# Patient Record
Sex: MALE | Race: WHITE
[De-identification: names, ages, dates, MRNs, and addresses within clinical notes are randomized per-mention and may not be internally consistent; named-entity substitution may affect disease eponyms.]

---

## 2017-01-24 ENCOUNTER — HOSPITAL ENCOUNTER (OUTPATIENT)
Age: 52
Discharge: HOME | End: 2017-01-24
Payer: MEDICAID

## 2017-01-24 NOTE — PN
I am seeing this 51-year-old male patient in followup in regards to 

obstructive sleep apnea. The patient is currently on a CPAP pressure 

of 16 cm of water and is using a Simplus full-face mask. Note that the 

patient's original evaluation and CPAP titration was done on 

3/10/2015, and initial pressure was around 14 cm of water. We have 

tried different masks on this patient, including an Eson full-face 

mask, comfort gel mask, and currently he is using a Simplus. The 

decision was made after he underwent a Pap nap study.  



Clinically he is improved. He is doing well. He is compliant with CPAP 

therapy; however, at times he is still snoring. His wife is concerned 

that the treatment has been suboptimal. I noted that the patient has 

gained a total of 19 pounds since his original titration.  



His BP is 145/78, pulse 100, respiration 16, temperature 98.3. BMI is 

50. Weight is 339. Height is 5 feet 9 inches. Neck size is 19 and 3/9 

inches. Saturation 95% on room air.  

GENERAL APPEARANCE: Obese, calm, comfortable. 

HEENT: Mallampati class IV. There is no goiter or neck masses. 

LUNGS: Clear to auscultation. 

HEART: Heart sounds are regular rate and rhythm. Normal S1, S2. 

ABDOMEN: Soft, nontender. No organomegaly. 

EXTREMITIES: No edema. No cyanosis or clubbing. 



IMPRESSION: 

1. Symptomatic obstructive sleep apnea, currently on CPAP pressure of 

16 utilizing a large-sized Simplus full-face mask.  

2. Snoring while on CPAP therapy. 

3. Obesity with an interval 20-pound weight gain. 

4. Diabetes mellitus. 

5. Hypertension. 

6. Chronic hypersomnia, improved. 



PLAN: 

1. Weight loss. 

2. Continue CPAP at a pressure of 16 cm of water. 

3. Provide the patient an AirFit F10 full-face mask on a trial basis. 

4. We will repeat his CPAP titration in April of 2017 to make sure the 

treatment is adequate, and further adjustments will be done 

accordingly.

## 2017-04-05 ENCOUNTER — HOSPITAL ENCOUNTER (OUTPATIENT)
Dept: HOSPITAL 47 - SLEEP | Age: 52
Discharge: HOME | End: 2017-04-05
Payer: MEDICAID

## 2017-04-05 DIAGNOSIS — E66.9: ICD-10-CM

## 2017-04-05 DIAGNOSIS — G47.33: Primary | ICD-10-CM

## 2017-04-05 DIAGNOSIS — E11.9: ICD-10-CM

## 2017-04-05 DIAGNOSIS — G47.10: ICD-10-CM

## 2017-04-05 DIAGNOSIS — I10: ICD-10-CM

## 2017-04-05 PROCEDURE — 95811 POLYSOM 6/>YRS CPAP 4/> PARM: CPT

## 2017-04-12 NOTE — SLS
DATE OF SERVICE: 04/05/2017



A 51-year-old male patient with known history of symptomatic 

obstructive sleep apnea. The patient was being treated with CPAP 

pressure of 16 cm of water utilizing a full face Simplus mask. 

Original titration was done in March of 2015, and his initial pressure 

was 14 cm of water. The patient was having difficulties in tolerating 

the treatment and we have trialed different masks on the patient 

including an (    ) full-face mask, comfort gel mask, and currently is 

using a Simplus. The patient reported that he was still snoring while 

on the CPAP machine. He had an interval 20 pound weight gain. For all 

of these reasons, I recommend that another CPAP titration for this 

patient to make sure the patient is adequately titrated and to make 

any adjustments on a CPAP pressure possible to improve his overall 

compliance.  



PERTINENT PHYSICAL FINDINGS: Height is 5 feet 9 inches. Weight 331 and 

BMI is 49.0.  



TECHNICAL DESCRIPTION:  The sleep evaluation of the patient consisted 

of clinical polysomnography, nocturnal respiratory battery, left and 

right anterior tibialis surface electromyography. 



The standard montage for the clinical polysomnography included the 

EEG, EOG, EMG, and EKG.  Respiratory battery included measurements of 

nasal buccal airflow, thoracic  and/or abdominal effort and 

intercostal surface EMG.  Nocturnal oxyhemoglobin saturations were 

obtained by finger oximetry.  Digital video and audio monitoring were 

done throughout the entire night to check for parasomnias. 



Step-wise titration with positive airway pressure was utilized during 

the study to control the respiratory events.   



STUDY OVERVIEW: Total time in bed was 6 hours and 35 minutes. Total 

sleep time was 6 hours and 18 minutes. Sleep efficiency was 80.5%. 

Latency to sleep onset was 2.2 minutes, latency to first REM sleep 

55.5 minutes and the patient's sleep architecture was characterized by 

9.7% stage I, 35.5% stage II, 14.4% stage III, and 25.3% REM sleep.  



SLEEP CONTINUITY SUMMARY: There are a total of 7 arousals with an 

index of 10.7. 



RESPIRATORY SUMMARY: The patient was initially started at CPAP 

pressure of 8 cm of water and the pressure was gradually increased by 

increments of 1 cm to reach a maximum CPAP pressure of 16 cm of water. 

I carefully reviewed the CPAP titration, taking into account the 

patient's sleep stage and body position. At a pressure of 12 cm of 

water, the patient was in REM sleep and he assumed the supine and 

sidewise body position. I thought that level of pressure was adequate, 

knowing at the patient's age, this level was around 2.7. No 

significant apneas or hypopneas were noted at this level of pressure. 

The titration was successfully continued to include pressures of 13, 

14, 15 and 16 cm of water and all of these pressures were adequate and 

effective in treating this patient's obstructive sleep apnea. No major 

oxygen desaturations were encountered during the titration.  



LIMB MOVEMENT SUMMARY: A total of 11 periodic limb movements with an 

index of 2.1.  



CARDIAC SUMMARY: Minimum heart rate was 76, maximum heart rate was 109. 



IMPRESSION: 

1. Symptomatic obstructive sleep apnea with ongoing difficulties 

utilizing CPAP. The patient has undergone a repeat CPAP titration.  

2. Symptomatic hypersomnia with an [place] score of 15. 

3. Obesity with interval 20 pound weight loss. 

4. Diabetes. 

5. Hypertension. 



PLAN: 

1. Drop the CPAP pressure to 12 cm of water with a C-Flex of 3. 

2. Continue Simplus full-face mask. 

3. See me back in 30 to 90 days to assess clinical response and 

compliance. Anticipate improvement in his compliancy with updated 

pressures. Encouraged further weight loss. Encouraged maintaining good 

sleep hygiene measures. We will make further recommendations and 

adjustments in his treatment based on his overall clinical response. 

The patient will see me back in the office after utilizing the updated 

pressure of 12 cm of water. Will continue to follow.

## 2017-05-02 ENCOUNTER — HOSPITAL ENCOUNTER (OUTPATIENT)
Dept: HOSPITAL 47 - SLEEP | Age: 52
Discharge: HOME | End: 2017-05-02
Payer: MEDICAID

## 2017-05-02 DIAGNOSIS — E66.9: ICD-10-CM

## 2017-05-02 DIAGNOSIS — Z99.89: ICD-10-CM

## 2017-05-02 DIAGNOSIS — G47.33: Primary | ICD-10-CM

## 2017-05-02 NOTE — PN
This patient is 51, coming in for a compliancy followup regarding his 

obstructive sleep apnea. The patient underwent a recent CPAP 

titration. He was titrated to a CPAP pressure of 12 cm of water. This 

was thought to be a low pressure, as the patient was snoring and he 

was not seeing the full benefit. He was subsequently increased to a 

CPAP pressure of 14 cm of water. His compliance data over the past 30 

days showed excellent CPAP use. CPAP use for more than 4 hours is 27 

out of 30, which accounts for 90% of the time. His average CPAP use is 

7 hours and 6 minutes. The patient has been using his CPAP every 

night. No major leaks around the mask and his leak only is 10 L per 

minute. His AHI while on treatment is 2.3. He is benefiting from the 

treatment. He is waking up refreshed and alert during the day. He does 

not fall asleep while driving. No other complaints otherwise. He liked 

the machine and he is willing to use it even when he takes short naps 

during the day. Mckin[place] score is at 11.  



GENERAL APPEARANCE: Calm, comfortable. 

HEENT: Negative for JVD. There is no goiter or neck mass. 

LUNGS: Diminished breath sounds bilaterally, was clear. Heart sounds 

are regular rate and rhythm. Normal S1, S2. No S3. No S4. No murmurs.  

ABDOMEN: Soft, nontender. No organomegaly. 

EXTREMITIES: No edema. No cyanosis or clubbing. 



IMPRESSION:

1. Symptomatic obstructive sleep apnea. The patient undergoing 

successful continuous positive airway pressure therapy at a pressure 

of 14 cm of water and treatment has been successful.  

2. Obesity. 

3. Diabetes. 

4. Hypertension. 



PLAN:

1. Encourage weight loss. 

2. Compliance data was checked and the treatment is appropriate and 

successful. The patient is benefiting from the treatment. The patient 

will see me back in a year's time; earlier if needed. Keep the CPAP 

pressure of 14. Eliminate the ramp.

## 2018-03-23 ENCOUNTER — HOSPITAL ENCOUNTER (OUTPATIENT)
Dept: HOSPITAL 47 - ORWHC2ENDO | Age: 53
Discharge: HOME | End: 2018-03-23
Payer: MEDICAID

## 2018-03-23 VITALS — RESPIRATION RATE: 18 BRPM | HEART RATE: 92 BPM

## 2018-03-23 VITALS — DIASTOLIC BLOOD PRESSURE: 88 MMHG | SYSTOLIC BLOOD PRESSURE: 141 MMHG

## 2018-03-23 VITALS — TEMPERATURE: 98.3 F

## 2018-03-23 VITALS — BODY MASS INDEX: 45.9 KG/M2

## 2018-03-23 DIAGNOSIS — Z99.89: ICD-10-CM

## 2018-03-23 DIAGNOSIS — Z79.899: ICD-10-CM

## 2018-03-23 DIAGNOSIS — K21.9: ICD-10-CM

## 2018-03-23 DIAGNOSIS — Z88.0: ICD-10-CM

## 2018-03-23 DIAGNOSIS — E11.9: ICD-10-CM

## 2018-03-23 DIAGNOSIS — Z79.82: ICD-10-CM

## 2018-03-23 DIAGNOSIS — E66.01: ICD-10-CM

## 2018-03-23 DIAGNOSIS — Z87.891: ICD-10-CM

## 2018-03-23 DIAGNOSIS — Z12.11: Primary | ICD-10-CM

## 2018-03-23 DIAGNOSIS — G47.33: ICD-10-CM

## 2018-03-23 DIAGNOSIS — D12.2: ICD-10-CM

## 2018-03-23 LAB
GLUCOSE BLD-MCNC: 143 MG/DL (ref 75–99)
GLUCOSE BLD-MCNC: 165 MG/DL (ref 75–99)

## 2018-03-23 PROCEDURE — 88305 TISSUE EXAM BY PATHOLOGIST: CPT

## 2018-03-23 PROCEDURE — 45385 COLONOSCOPY W/LESION REMOVAL: CPT

## 2018-03-23 NOTE — P.GSHP
History of Present Illness


H&P Date: 03/23/18


Chief Complaint: Colon cancer screening





Patient here today for colonoscopy.  Last colonoscopy 9 years ago.  No bowel 

related complaints.  No family history of colon cancer.





Past Medical History


Past Medical History: Diabetes Mellitus, Sleep Apnea/CPAP/BIPAP


History of Any Multi-Drug Resistant Organisms: None Reported


Past Surgical History: Appendectomy


Additional Past Surgical History / Comment(s): COLONOSCOPY


Past Anesthesia/Blood Transfusion Reactions: No Reported Reaction


Smoking Status: Former smoker





- Past Family History


  ** Mother


Family Medical History: No Reported History





Medications and Allergies


 Home Medications











 Medication  Instructions  Recorded  Confirmed  Type


 


Aspirin 325 mg PO DAILY 03/20/18 03/23/18 History


 


Canagliflozin [Invokana] 100 mg PO BID 03/20/18 03/20/18 History


 


Citalopram Hydrobromide [CeleXA] 20 mg PO DAILY 03/20/18 03/23/18 History


 


Cyanocobalamin (Vitamin B-12) 1,000 mcg PO DAILY 03/20/18 03/23/18 History





[Vitamin B-12]    


 


Multivitamin [Men's Multi-Vitamin] 1 each PO DAILY 03/20/18 03/23/18 History


 


Sildenafil Citrate [Viagra] 50 mg PO ONCE PRN 03/20/18 03/23/18 History


 


buPROPion HCL [buPROPion HCL SR] 150 mg PO DAILY 03/20/18 03/23/18 History











 Allergies











Allergy/AdvReac Type Severity Reaction Status Date / Time


 


Penicillins Allergy  Anaphylaxis Verified 03/23/18 07:04














Surgical - Exam


 Vital Signs











Temp Pulse Resp BP Pulse Ox


 


 98.3 F   98   18   131/90   98 


 


 03/23/18 07:14  03/23/18 07:14  03/23/18 07:14  03/23/18 07:14  03/23/18 07:14

















Physical exam:


General: Well-developed, well-nourished


HEENT: Normocephalic, sclerae nonicteric


Abdomen: Nontender, nondistended


Extremities: No edema


Neuro: Alert and oriented





Results





- Labs


 Abnormal Lab Results - Last 24 Hours (Table)











  03/23/18 Range/Units





  07:14 


 


POC Glucose (mg/dL)  165 H  (75-99)  mg/dL














Assessment and Plan


(1) Colon cancer screening


Narrative/Plan: 


Will proceed with colonoscopy at this time


Current Visit: Yes   Status: Acute   Code(s): Z12.11 - ENCOUNTER FOR SCREENING 

FOR MALIGNANT NEOPLASM OF COLON   SNOMED Code(s): 434201495

## 2018-03-23 NOTE — P.PCN
Date of Procedure: 03/23/18


Procedure(s) Performed: 


PREOPERATIVE DIAGNOSIS: Colon cancer screening


POSTOPERATIVE DIAGNOSIS: Ascending colon polyp, sigmoid colon polyp, poor prep


PROCEDURE: Colonoscopy with snare polypectomy


ANESTHESIA: MAC


SURGEON: Pelon Baker M.D.


SPECIMENS: Polyps 


ENDOSCOPIC PROCEDURE:  The patient was placed on the endoscopy table in the 

left decubitus position.  The Olympus colonoscope was inserted into the anus 

and passed under direct visualization to the base of the cecum.  The patient's 

prep was suboptimal with a large volume of liquid stool seen scattered 

throughout the colon.  We aspirated approximately 1 L or more of diarrhea.  

There was some particulate matter that also limited her visualization.  With 

difficulty we were finally able to reach the cecum.  The appendiceal orifice 

was visualized.  From that point the scope was slowly withdrawn inspecting all 

surfaces carefully.  There were no neoplastic inflammatory or polypoid lesions 

throughout the cecum.  In the mid ascending colon there was noted be what 

initially was thought to represent a small to moderate size polyp measuring 

about 1 cm however as we started removing it using the snare with cautery 

technique this had a larger appearance.  I would estimate this at approximately 

1.5-2 cm in diameter.  This was able to be removed in a piecemeal fashion using 

the snare with cautery technique.  This was behind a fold and difficult to 

visualize the base with certainty.  The remainder of the ascending transverse 

and descending colon appeared normal.  In the sigmoid a small polyp was 

identified and removed using the snare technique as well.  There was no visible 

diverticulosis.  Digital rectal examination was normal.  The patient was taken 

to the recovery room in stable condition per anesthesia guidelines.


RECOMMENDATIONS: Await biopsy results.  Recommend follow-up colonoscopy in 6-12 

months because of the poor prep and the larger than expected polyp.

## 2018-09-13 ENCOUNTER — HOSPITAL ENCOUNTER (OUTPATIENT)
Dept: HOSPITAL 47 - BARWHC3 | Age: 53
Discharge: HOME | End: 2018-09-13
Attending: SURGERY
Payer: MEDICAID

## 2018-09-13 VITALS — RESPIRATION RATE: 14 BRPM | DIASTOLIC BLOOD PRESSURE: 85 MMHG | HEART RATE: 92 BPM | SYSTOLIC BLOOD PRESSURE: 157 MMHG

## 2018-09-13 VITALS — BODY MASS INDEX: 50.9 KG/M2

## 2018-09-13 DIAGNOSIS — Z90.89: ICD-10-CM

## 2018-09-13 DIAGNOSIS — F32.9: ICD-10-CM

## 2018-09-13 DIAGNOSIS — E66.01: Primary | ICD-10-CM

## 2018-09-13 PROCEDURE — 99211 OFF/OP EST MAY X REQ PHY/QHP: CPT

## 2018-09-13 NOTE — P.HPBAR
Bariatric H&P





- History & Physicial


H&P Date: 09/13/18


History & Physicial: 


Visit/CC: bariatric consult





Patient initial contact: 





Initial weight: 152.498 kg


Initial weight in pounds: 336.20


Height: 5 ft 8.11 in


Initial BMI: 50.9





Last weight: 





Current weight: 152.498 kg


Current weight in pounds: 336.20


Current BMI: 50.9





Ideal body weight (based on NIH guidelines): 70.153 kg





Excess body weight loss: 0.0%








The patient is a 53 year-old M who presents for Bariatric Assessment.  Patient 

is well-known to our service.  Most recently had a colonoscopy performed in 

March where he was found have a polyp in the right colon.  Pathology was 

negative for dysplasia.  He is due for follow-up colonoscopy at this time.  

Today he is being seen as a new bariatric clinic patient.  Patient has dealt 

with his weight for many years.  Recently he went on a low carb diet and has 

lost about 20 pounds over the last 2 months.  Patient suffers from type 2 

diabetes, arthritis, sleep apnea, mild GERD symptoms.  These medical issues are 

related to his morbid obesity and should be improving surgical weight loss.  No 

history of DVT or dysphagia in the past.














Past Medical History


Past Medical History: Diabetes Mellitus, Sleep Apnea/CPAP/BIPAP


History of Any Multi-Drug Resistant Organisms: None Reported


Past Surgical History: Appendectomy


Additional Past Surgical History / Comment(s): COLONOSCOPY x3


Past Anesthesia/Blood Transfusion Reactions: No Reported Reaction


Additional Past Anesthesia/Blood Transfusion Reaction / Comm: No tranfusion to 

date


Past Psychological History: Depression


Additional Psychological History / Comment(s): takes wellbutrin and celexa daily


Smoking Status: Never smoker


Past Alcohol Use History: Rare


Past Drug Use History: None Reported





- Past Family History


  ** Mother


Family Medical History: No Reported History





Surgical - Exam


 Vital Signs











Pulse Resp BP


 


 92   14   157/85 


 


 09/13/18 15:37  09/13/18 15:37  09/13/18 15:37

















Physical exam:


General: Well-developed, well-nourished


HEENT: Normocephalic, sclerae nonicteric


Abdomen: Nontender, nondistended


Extremities: No edema


Neuro: Alert and oriented





Bariatric Assessment & Plan


(1) Morbid obesity


Narrative/Plan: 


Options of sleeve gastrectomy and gastric bypass reviewed in detail.  The risk 

benefit profile of both were reviewed.  Patient remains assisted sleeve 

gastrectomy at this time.  He would like to continue his lifestyle and dietary 

modification to see if he can continue to lose weight on his own.  Patient 

requires a 6 month supervised weight loss.  We'll tentatively plan upper and 

lower endoscopy in the next month or 2.  If the patient fails in his weight 

loss endeavor will tentatively plan sleeve gastrectomy in early 2018.  The 

risks of bleeding, infection, stenosis, stricture, leak, abscess, fistula 

formation, peritonitis, poor weight loss, reflux, vomiting, conversion to an 

open procedure, aborting sleeve gastrectomy, MI, PE, DVT, and death were 

discussed.  The patient understands and wishes to proceed.


Status: Acute   





Bariatric Checklist


Checklist: 


Plan: 





Checklist: 





EGD: 


1. Hiatal hernia: 


2. H. Pylori: 





HgbA1c: 





Vitamin D: 





Smoking: Never smoker





Primary care physician referral: mar beard





Psychiatry clearance: 





Cardiology clearance: 





Sleep study: 





Diet journal: 





VTE risk score: 





VTE risk level: 





Rehab needs at discharge:

## 2018-10-19 ENCOUNTER — HOSPITAL ENCOUNTER (OUTPATIENT)
Dept: HOSPITAL 47 - ORWHC2ENDO | Age: 53
Discharge: HOME | End: 2018-10-19
Attending: SURGERY
Payer: MEDICAID

## 2018-10-19 VITALS — HEART RATE: 71 BPM | DIASTOLIC BLOOD PRESSURE: 85 MMHG | SYSTOLIC BLOOD PRESSURE: 133 MMHG

## 2018-10-19 VITALS — TEMPERATURE: 97.8 F

## 2018-10-19 VITALS — BODY MASS INDEX: 45 KG/M2

## 2018-10-19 VITALS — RESPIRATION RATE: 16 BRPM

## 2018-10-19 DIAGNOSIS — K25.3: ICD-10-CM

## 2018-10-19 DIAGNOSIS — E66.01: ICD-10-CM

## 2018-10-19 DIAGNOSIS — F32.9: ICD-10-CM

## 2018-10-19 DIAGNOSIS — K29.50: ICD-10-CM

## 2018-10-19 DIAGNOSIS — Z88.0: ICD-10-CM

## 2018-10-19 DIAGNOSIS — Z79.890: ICD-10-CM

## 2018-10-19 DIAGNOSIS — G47.33: ICD-10-CM

## 2018-10-19 DIAGNOSIS — Z12.11: Primary | ICD-10-CM

## 2018-10-19 DIAGNOSIS — E11.9: ICD-10-CM

## 2018-10-19 DIAGNOSIS — Z86.010: ICD-10-CM

## 2018-10-19 DIAGNOSIS — Z99.89: ICD-10-CM

## 2018-10-19 DIAGNOSIS — Z79.899: ICD-10-CM

## 2018-10-19 DIAGNOSIS — Z79.82: ICD-10-CM

## 2018-10-19 LAB — GLUCOSE BLD-MCNC: 118 MG/DL (ref 75–99)

## 2018-10-19 PROCEDURE — 43239 EGD BIOPSY SINGLE/MULTIPLE: CPT

## 2018-10-19 PROCEDURE — 45378 DIAGNOSTIC COLONOSCOPY: CPT

## 2018-10-19 PROCEDURE — 88305 TISSUE EXAM BY PATHOLOGIST: CPT

## 2018-10-19 NOTE — P.GSHP
History of Present Illness


H&P Date: 10/19/18


Chief Complaint: GERD, colon polyps





Patient here today for colonoscopy.  Colonoscopy in March of this year revealed 

a moderate sized ascending colon polyp.  Patient also has mild reflux.  Patient 

being workup for bariatric surgery.  No bowel complaints.





Past Medical History


Past Medical History: Diabetes Mellitus, Sleep Apnea/CPAP/BIPAP


History of Any Multi-Drug Resistant Organisms: None Reported


Past Surgical History: Appendectomy


Additional Past Surgical History / Comment(s): COLONOSCOPY x3


Past Anesthesia/Blood Transfusion Reactions: No Reported Reaction


Additional Past Anesthesia/Blood Transfusion Reaction / Comment(s): No 

tranfusion to date


Smoking Status: Never smoker





- Past Family History


  ** Mother


Family Medical History: No Reported History





Medications and Allergies


 Home Medications











 Medication  Instructions  Recorded  Confirmed  Type


 


Aspirin 325 mg PO DAILY 03/20/18 10/19/18 History


 


Canagliflozin [Invokana] 100 mg PO DAILY 03/20/18 10/19/18 History


 


Citalopram Hydrobromide [CeleXA] 20 mg PO DAILY 03/20/18 10/19/18 History


 


Cyanocobalamin (Vitamin B-12) 1,000 mcg PO DAILY 03/20/18 10/19/18 History





[Vitamin B-12]    


 


Multivitamin [Men's Multi-Vitamin] 1 each PO DAILY 03/20/18 10/19/18 History


 


Sildenafil Citrate [Viagra] 50 mg PO ONCE PRN 03/20/18 10/19/18 History


 


buPROPion HCL [buPROPion HCL SR] 150 mg PO DAILY 03/20/18 10/19/18 History


 


Testosterone Cypionate 100 mg IM Q14D 10/17/18 10/19/18 History





[Depo-Testosterone]    











 Allergies











Allergy/AdvReac Type Severity Reaction Status Date / Time


 


Penicillins Allergy Severe Anaphylaxis Verified 10/19/18 07:45














Surgical - Exam


 Vital Signs











Temp Pulse Resp BP Pulse Ox


 


 97.8 F   87   18   109/77   98 


 


 10/19/18 07:57  10/19/18 07:57  10/19/18 07:57  10/19/18 07:57  10/19/18 07:57

















Physical exam:


General: Well-developed, well-nourished


HEENT: Normocephalic, sclerae nonicteric


Abdomen: Nontender, nondistended


Extremities: No edema


Neuro: Alert and oriented





Results





- Labs


 Abnormal Lab Results - Last 24 Hours (Table)











  10/19/18 Range/Units





  08:08 


 


POC Glucose (mg/dL)  118 H  (75-99)  mg/dL














Assessment and Plan


(1) Colon cancer screening


Narrative/Plan: 


Will proceed with upper and lower endoscopy


Current Visit: No   Status: Acute   Code(s): Z12.11 - ENCOUNTER FOR SCREENING 

FOR MALIGNANT NEOPLASM OF COLON   SNOMED Code(s): 138593064

## 2018-10-19 NOTE — P.PCN
Date of Procedure: 10/19/18


Procedure(s) Performed: 


PREOPERATIVE DIAGNOSIS: GERD, presurgical, history of colon polyps, screening


POSTOPERATIVE DIAGNOSIS: Gastritis with antral ulcerations, poor colonic prep


PROCEDURE: 1.  EGD with biopsy 2.  Colonoscopy 


ANESTHESIA: MAC


SURGEON: Pelon Baker M.D.


SPECIMENS: Antral ulcer, antrum


ENDOSCOPIC PROCEDURE: The patient was on the endoscopy table in the left 

decubitus position.  The Olympus gastroscope was inserted into the oropharynx 

and passed under direct visualization to the region of the third portion of the 

duodenum.  From that point the scope was slowly withdrawn inspecting all 

surfaces carefully.  There were no neoplastic inflammatory or polypoid lesions 

throughout the duodenum.  The pylorus was widely patent.  The stomach was 

carefully inspected.  There was noted to be small ulcers in the antrum.  The 

largest measured about 5-6 mm in size.  This was biopsied..  A biopsy of the 

antrum took place to rule out H. pylori.  Retroflexion revealed a normal 

hiatus.  The esophagus was then carefully examined.  There were no neoplastic 

inflammatory or polypoid lesions throughout the visualized esophagus.


     The patient was kept on the endoscopy table in the left decubitus 

position.  The Olympus colonoscope was inserted into the anus and passed under 

direct visualization to the mid transverse colon.  The patient's prep was quite 

poor throughout.  I could not visualize the mucosa in the visualized colon.  We 

aborted at that point.  No definite abnormalities were identified however.  

Digital rectal examination was normal.  The patient was taken to the recovery 

room in stable condition per anesthesia guidelines.


RECOMMENDATIONS: Await biopsy results.  Begin antiacids.  Will require reprep 

for colonoscopy.

## 2021-02-22 ENCOUNTER — HOSPITAL ENCOUNTER (EMERGENCY)
Dept: HOSPITAL 47 - EC | Age: 56
Discharge: HOME | End: 2021-02-22
Payer: MEDICAID

## 2021-02-22 VITALS — HEART RATE: 90 BPM | SYSTOLIC BLOOD PRESSURE: 133 MMHG | DIASTOLIC BLOOD PRESSURE: 87 MMHG | TEMPERATURE: 98.6 F

## 2021-02-22 VITALS — RESPIRATION RATE: 18 BRPM

## 2021-02-22 DIAGNOSIS — Z88.0: ICD-10-CM

## 2021-02-22 DIAGNOSIS — F32.9: ICD-10-CM

## 2021-02-22 DIAGNOSIS — E11.9: ICD-10-CM

## 2021-02-22 DIAGNOSIS — Z79.899: ICD-10-CM
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
## 2021-02-22 DIAGNOSIS — S29.9XXA: Primary | ICD-10-CM

## 2021-02-22 DIAGNOSIS — Z90.49: ICD-10-CM

## 2021-02-22 DIAGNOSIS — Z99.89: ICD-10-CM

## 2021-02-22 DIAGNOSIS — Z79.84: ICD-10-CM

## 2021-02-22 DIAGNOSIS — G47.33: ICD-10-CM
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
## 2021-02-22 PROCEDURE — 71101 X-RAY EXAM UNILAT RIBS/CHEST: CPT

## 2021-02-22 PROCEDURE — 93005 ELECTROCARDIOGRAM TRACING: CPT

## 2021-02-22 PROCEDURE — 96372 THER/PROPH/DIAG INJ SC/IM: CPT

## 2021-02-22 PROCEDURE — 99283 EMERGENCY DEPT VISIT LOW MDM: CPT

## 2021-02-22 NOTE — ED
Fall HPI





- General


Chief Complaint: Fall


Stated Complaint: Fall


Time Seen by Provider: 02/22/21 04:39


Source: patient


Mode of arrival: ambulatory





- History of Present Illness


MD Complaint: fall


-: minutes(s)


Fall From: down stairs (#) (3)


When Fall Occurred: just prior to arrival


Place Fall Occurred: home


Loss of Consciousness: none


Prolonged Down Time?: no


Symptoms Prior to Fall: none


Location: chest


Severity: moderate


Quality: aching


Context: tripped/slipped


Associated Symptoms: denies





- Related Data


                                Home Medications











 Medication  Instructions  Recorded  Confirmed


 


Aspirin 325 mg PO DAILY 03/20/18 10/19/18


 


Canagliflozin [Invokana] 100 mg PO DAILY 03/20/18 10/19/18


 


Citalopram Hydrobromide [CeleXA] 20 mg PO DAILY 03/20/18 10/19/18


 


Cyanocobalamin (Vitamin B-12) 1,000 mcg PO DAILY 03/20/18 10/19/18





[Vitamin B-12]   


 


Multivitamin [Men's Multi-Vitamin] 1 each PO DAILY 03/20/18 10/19/18


 


Sildenafil Citrate [Viagra] 50 mg PO ONCE PRN 03/20/18 10/19/18


 


buPROPion HCL [buPROPion HCL SR] 150 mg PO DAILY 03/20/18 10/19/18


 


Testosterone Cypionate 100 mg IM Q14D 10/17/18 10/19/18





[Depo-Testosterone]   








                                  Previous Rx's











 Medication  Instructions  Recorded


 


Omeprazole [PriLOSEC] 20 mg PO AC-BRKFST #90 cap 10/19/18


 


HYDROcodone/APAP 5-325MG [Norco 1 tab PO Q4HR PRN 3 Days #18 tab 02/22/21





5-325]  


 


Ibuprofen 800 mg PO TID #20 tablet 02/22/21











                                    Allergies











Allergy/AdvReac Type Severity Reaction Status Date / Time


 


Penicillins Allergy Severe Anaphylaxis Verified 02/22/21 04:36














Review of Systems


ROS Statement: 


Those systems with pertinent positive or pertinent negative responses have been 

documented in the HPI.





ROS Other: All systems not noted in ROS Statement are negative.


Constitutional: Denies: fever, chills


Respiratory: Denies: cough, dyspnea


Cardiovascular: Reports: chest pain.  Denies: palpitations, dyspnea on exertion


Gastrointestinal: Denies: abdominal pain, vomiting, diarrhea


Musculoskeletal: Denies: back pain


Skin: Denies: lesions


Neurological: Denies: headache, weakness, numbness, confusion





Past Medical History


Past Medical History: Diabetes Mellitus, Sleep Apnea/CPAP/BIPAP


History of Any Multi-Drug Resistant Organisms: None Reported


Past Surgical History: Appendectomy


Additional Past Surgical History / Comment(s): COLONOSCOPY x3


Past Anesthesia/Blood Transfusion Reactions: No Reported Reaction


Additional Past Anesthesia/Blood Transfusion Reaction / Comment(s): No 

tranfusion to date


Past Psychological History: Depression


Smoking Status: Never smoker


Past Alcohol Use History: None Reported


Past Drug Use History: None Reported





- Past Family History


  ** Mother


Family Medical History: No Reported History





General Exam


General appearance: alert, in no apparent distress


Head exam: Present: atraumatic, normocephalic


Eye exam: Present: normal appearance.  Absent: scleral icterus, conjunctival 

injection


Neck exam: Present: normal inspection, full ROM.  Absent: tenderness


Respiratory exam: Present: normal lung sounds bilaterally, chest wall 

tenderness.  Absent: respiratory distress, wheezes, rales, rhonchi, stridor


Cardiovascular Exam: Present: regular rate, normal rhythm, normal heart sounds. 

Absent: systolic murmur, diastolic murmur, rubs, gallop


GI/Abdominal exam: Present: soft.  Absent: distended, tenderness, guarding, 

rebound, rigid, mass


Extremities exam: Present: normal inspection, normal capillary refill.  Absent: 

pedal edema, calf tenderness


Back exam: Present: normal inspection.  Absent: vertebral tenderness


Neurological exam: Present: alert, oriented X3, normal gait


Skin exam: Present: warm, dry, intact, normal color.  Absent: rash





Course


                                   Vital Signs











  02/22/21





  04:31


 


Temperature 98.2 F


 


Pulse Rate 92


 


Respiratory 18





Rate 


 


Blood Pressure 147/95


 


O2 Sat by Pulse 98





Oximetry 














Medical Decision Making





- EKG Data


EKG shows normal: sinus rhythm, axis (Normal), intervals (Normal), QRS complexes

(Normal), ST-T waves (Normal)


Rate: normal (Rate 79 bpm)


Interpretation: normal EKG





Disposition


Clinical Impression: 


 Fall, Chest wall injury





Disposition: HOME SELF-CARE


Condition: Good


Instructions (If sedation given, give patient instructions):  Chest Wall Pain 

(ED)


Prescriptions: 


Ibuprofen 800 mg PO TID #20 tablet


HYDROcodone/APAP 5-325MG [Norco 5-325] 1 tab PO Q4HR PRN 3 Days #18 tab


 PRN Reason: Pain


Is patient prescribed a controlled substance at d/c from ED?: Yes


Referrals: 


Abimael Roque MD [Primary Care Provider] - 1-2 days

## 2021-02-22 NOTE — XR
EXAM:

  XR Left Ribs, 2 Views

 

CLINICAL HISTORY:

  ITS.REASON XR Reason: fall

 

TECHNIQUE:

  Frontal and oblique views of the left ribs.

 

COMPARISON:

  No relevant prior studies available.

 

FINDINGS:

  Lungs:  Unremarkable as visualized.  No consolidation.

  Pleural space:  Unremarkable.  No pneumothorax.

  Bones/joints:  Mild degenerative changes in the mid to lower thoracic 

spine.  The left-sided ribs appear smooth and intact.  No fracture is 

identified.

 

IMPRESSION:     

  No acute findings in the left ribs.

## 2022-09-12 ENCOUNTER — HOSPITAL ENCOUNTER (INPATIENT)
Dept: HOSPITAL 47 - EC | Age: 57
LOS: 2 days | Discharge: HOME | DRG: 638 | End: 2022-09-14
Attending: FAMILY MEDICINE | Admitting: FAMILY MEDICINE
Payer: MEDICAID

## 2022-09-12 VITALS — BODY MASS INDEX: 45.4 KG/M2

## 2022-09-12 DIAGNOSIS — Z88.0: ICD-10-CM

## 2022-09-12 DIAGNOSIS — R19.7: ICD-10-CM

## 2022-09-12 DIAGNOSIS — I10: ICD-10-CM

## 2022-09-12 DIAGNOSIS — Z79.4: ICD-10-CM

## 2022-09-12 DIAGNOSIS — F32.A: ICD-10-CM

## 2022-09-12 DIAGNOSIS — E11.10: Primary | ICD-10-CM

## 2022-09-12 DIAGNOSIS — E86.0: ICD-10-CM

## 2022-09-12 DIAGNOSIS — Z79.82: ICD-10-CM

## 2022-09-12 DIAGNOSIS — E66.9: ICD-10-CM

## 2022-09-12 DIAGNOSIS — G47.30: ICD-10-CM

## 2022-09-12 DIAGNOSIS — R00.0: ICD-10-CM

## 2022-09-12 DIAGNOSIS — R53.1: ICD-10-CM

## 2022-09-12 LAB
ALBUMIN SERPL-MCNC: 4.4 G/DL (ref 3.5–5)
ALBUMIN SERPL-MCNC: 4.7 G/DL (ref 3.5–5)
ALBUMIN SERPL-MCNC: 5.6 G/DL (ref 3.5–5)
ALP SERPL-CCNC: 60 U/L (ref 38–126)
ALP SERPL-CCNC: 63 U/L (ref 38–126)
ALP SERPL-CCNC: 79 U/L (ref 38–126)
ALT SERPL-CCNC: 23 U/L (ref 4–49)
ALT SERPL-CCNC: 24 U/L (ref 4–49)
ALT SERPL-CCNC: 31 U/L (ref 4–49)
ANION GAP SERPL CALC-SCNC: 12 MMOL/L
ANION GAP SERPL CALC-SCNC: 16 MMOL/L
ANION GAP SERPL CALC-SCNC: 17 MMOL/L
ANION GAP SERPL CALC-SCNC: 19 MMOL/L
ANION GAP SERPL CALC-SCNC: 26 MMOL/L
APTT BLD: 24.9 SEC (ref 22–30)
AST SERPL-CCNC: 20 U/L (ref 17–59)
AST SERPL-CCNC: 20 U/L (ref 17–59)
AST SERPL-CCNC: 27 U/L (ref 17–59)
BASOPHILS # BLD AUTO: 0 K/UL (ref 0–0.2)
BASOPHILS # BLD AUTO: 0.1 K/UL (ref 0–0.2)
BASOPHILS NFR BLD AUTO: 0 %
BASOPHILS NFR BLD AUTO: 0 %
BUN SERPL-SCNC: 13 MG/DL (ref 9–20)
BUN SERPL-SCNC: 14 MG/DL (ref 9–20)
BUN SERPL-SCNC: 16 MG/DL (ref 9–20)
BUN SERPL-SCNC: 17 MG/DL (ref 9–20)
BUN SERPL-SCNC: 19 MG/DL (ref 9–20)
CALCIUM SPEC-MCNC: 10.6 MG/DL (ref 8.4–10.2)
CALCIUM SPEC-MCNC: 8.9 MG/DL (ref 8.4–10.2)
CALCIUM SPEC-MCNC: 9.3 MG/DL (ref 8.4–10.2)
CHLORIDE SERPL-SCNC: 101 MMOL/L (ref 98–107)
CHLORIDE SERPL-SCNC: 104 MMOL/L (ref 98–107)
CHLORIDE SERPL-SCNC: 104 MMOL/L (ref 98–107)
CHLORIDE SERPL-SCNC: 106 MMOL/L (ref 98–107)
CHLORIDE SERPL-SCNC: 106 MMOL/L (ref 98–107)
CO2 SERPL-SCNC: 11 MMOL/L (ref 22–30)
CO2 SERPL-SCNC: 13 MMOL/L (ref 22–30)
CO2 SERPL-SCNC: 14 MMOL/L (ref 22–30)
CO2 SERPL-SCNC: 15 MMOL/L (ref 22–30)
CO2 SERPL-SCNC: 7 MMOL/L (ref 22–30)
EOSINOPHIL # BLD AUTO: 0.1 K/UL (ref 0–0.7)
EOSINOPHIL # BLD AUTO: 0.1 K/UL (ref 0–0.7)
EOSINOPHIL NFR BLD AUTO: 0 %
EOSINOPHIL NFR BLD AUTO: 1 %
ERYTHROCYTE [DISTWIDTH] IN BLOOD BY AUTOMATED COUNT: 5.79 M/UL (ref 4.3–5.9)
ERYTHROCYTE [DISTWIDTH] IN BLOOD BY AUTOMATED COUNT: 6.6 M/UL (ref 4.3–5.9)
ERYTHROCYTE [DISTWIDTH] IN BLOOD: 14.2 % (ref 11.5–15.5)
ERYTHROCYTE [DISTWIDTH] IN BLOOD: 14.3 % (ref 11.5–15.5)
GLUCOSE BLD-MCNC: 119 MG/DL (ref 70–110)
GLUCOSE BLD-MCNC: 136 MG/DL (ref 70–110)
GLUCOSE BLD-MCNC: 145 MG/DL (ref 70–110)
GLUCOSE BLD-MCNC: 150 MG/DL (ref 70–110)
GLUCOSE BLD-MCNC: 158 MG/DL (ref 70–110)
GLUCOSE BLD-MCNC: 167 MG/DL (ref 70–110)
GLUCOSE BLD-MCNC: 167 MG/DL (ref 70–110)
GLUCOSE BLD-MCNC: 176 MG/DL (ref 70–110)
GLUCOSE BLD-MCNC: 178 MG/DL (ref 70–110)
GLUCOSE BLD-MCNC: 178 MG/DL (ref 70–110)
GLUCOSE BLD-MCNC: 182 MG/DL (ref 70–110)
GLUCOSE BLD-MCNC: 183 MG/DL (ref 70–110)
GLUCOSE BLD-MCNC: 187 MG/DL (ref 70–110)
GLUCOSE BLD-MCNC: 200 MG/DL (ref 70–110)
GLUCOSE BLD-MCNC: 206 MG/DL (ref 70–110)
GLUCOSE BLD-MCNC: 295 MG/DL (ref 70–110)
GLUCOSE SERPL-MCNC: 129 MG/DL (ref 74–99)
GLUCOSE SERPL-MCNC: 162 MG/DL (ref 74–99)
GLUCOSE SERPL-MCNC: 173 MG/DL (ref 74–99)
GLUCOSE SERPL-MCNC: 194 MG/DL (ref 74–99)
GLUCOSE SERPL-MCNC: 298 MG/DL (ref 74–99)
GLUCOSE UR QL: (no result)
HCO3 BLDV-SCNC: 8 MMOL/L (ref 24–28)
HCT VFR BLD AUTO: 51.9 % (ref 39–53)
HCT VFR BLD AUTO: 61 % (ref 39–53)
HGB BLD-MCNC: 17.5 GM/DL (ref 13–17.5)
HGB BLD-MCNC: 19.5 GM/DL (ref 13–17.5)
INR PPP: 0.9 (ref ?–1.2)
KETONES UR QL STRIP.AUTO: (no result)
LYMPHOCYTES # SPEC AUTO: 1 K/UL (ref 1–4.8)
LYMPHOCYTES # SPEC AUTO: 1.5 K/UL (ref 1–4.8)
LYMPHOCYTES NFR SPEC AUTO: 14 %
LYMPHOCYTES NFR SPEC AUTO: 7 %
MAGNESIUM SPEC-SCNC: 2.4 MG/DL (ref 1.6–2.3)
MCH RBC QN AUTO: 29.5 PG (ref 25–35)
MCH RBC QN AUTO: 30.3 PG (ref 25–35)
MCHC RBC AUTO-ENTMCNC: 31.9 G/DL (ref 31–37)
MCHC RBC AUTO-ENTMCNC: 33.8 G/DL (ref 31–37)
MCV RBC AUTO: 89.6 FL (ref 80–100)
MCV RBC AUTO: 92.5 FL (ref 80–100)
MONOCYTES # BLD AUTO: 0.7 K/UL (ref 0–1)
MONOCYTES # BLD AUTO: 0.8 K/UL (ref 0–1)
MONOCYTES NFR BLD AUTO: 5 %
MONOCYTES NFR BLD AUTO: 7 %
NEUTROPHILS # BLD AUTO: 11.6 K/UL (ref 1.3–7.7)
NEUTROPHILS # BLD AUTO: 8.4 K/UL (ref 1.3–7.7)
NEUTROPHILS NFR BLD AUTO: 77 %
NEUTROPHILS NFR BLD AUTO: 86 %
PCO2 BLDV: 27 MMHG (ref 37–51)
PH BLDV: 7.09 [PH] (ref 7.31–7.41)
PH UR: 5 [PH] (ref 5–8)
PLATELET # BLD AUTO: 234 K/UL (ref 150–450)
PLATELET # BLD AUTO: 279 K/UL (ref 150–450)
POTASSIUM SERPL-SCNC: 4.1 MMOL/L (ref 3.5–5.1)
POTASSIUM SERPL-SCNC: 4.2 MMOL/L (ref 3.5–5.1)
POTASSIUM SERPL-SCNC: 4.5 MMOL/L (ref 3.5–5.1)
POTASSIUM SERPL-SCNC: 4.6 MMOL/L (ref 3.5–5.1)
POTASSIUM SERPL-SCNC: 5.5 MMOL/L (ref 3.5–5.1)
PROT SERPL-MCNC: 6.8 G/DL (ref 6.3–8.2)
PROT SERPL-MCNC: 7.3 G/DL (ref 6.3–8.2)
PROT SERPL-MCNC: 8.6 G/DL (ref 6.3–8.2)
PROT UR QL: (no result)
PT BLD: 10.3 SEC (ref 9–12)
RBC UR QL: 1 /HPF (ref 0–5)
SODIUM SERPL-SCNC: 133 MMOL/L (ref 137–145)
SODIUM SERPL-SCNC: 134 MMOL/L (ref 137–145)
SODIUM SERPL-SCNC: 136 MMOL/L (ref 137–145)
SP GR UR: 1.02 (ref 1–1.03)
SQUAMOUS UR QL AUTO: <1 /HPF (ref 0–4)
UROBILINOGEN UR QL STRIP: <2 MG/DL (ref ?–2)
WBC # BLD AUTO: 11 K/UL (ref 3.8–10.6)
WBC # BLD AUTO: 13.5 K/UL (ref 3.8–10.6)
WBC #/AREA URNS HPF: <1 /HPF (ref 0–5)

## 2022-09-12 PROCEDURE — 82803 BLOOD GASES ANY COMBINATION: CPT

## 2022-09-12 PROCEDURE — 83605 ASSAY OF LACTIC ACID: CPT

## 2022-09-12 PROCEDURE — 96361 HYDRATE IV INFUSION ADD-ON: CPT

## 2022-09-12 PROCEDURE — 80051 ELECTROLYTE PANEL: CPT

## 2022-09-12 PROCEDURE — 80061 LIPID PANEL: CPT

## 2022-09-12 PROCEDURE — 85025 COMPLETE CBC W/AUTO DIFF WBC: CPT

## 2022-09-12 PROCEDURE — 94760 N-INVAS EAR/PLS OXIMETRY 1: CPT

## 2022-09-12 PROCEDURE — 83036 HEMOGLOBIN GLYCOSYLATED A1C: CPT

## 2022-09-12 PROCEDURE — 83735 ASSAY OF MAGNESIUM: CPT

## 2022-09-12 PROCEDURE — 83880 ASSAY OF NATRIURETIC PEPTIDE: CPT

## 2022-09-12 PROCEDURE — 84484 ASSAY OF TROPONIN QUANT: CPT

## 2022-09-12 PROCEDURE — 99285 EMERGENCY DEPT VISIT HI MDM: CPT

## 2022-09-12 PROCEDURE — 99291 CRITICAL CARE FIRST HOUR: CPT

## 2022-09-12 PROCEDURE — 81001 URINALYSIS AUTO W/SCOPE: CPT

## 2022-09-12 PROCEDURE — 36415 COLL VENOUS BLD VENIPUNCTURE: CPT

## 2022-09-12 PROCEDURE — 71045 X-RAY EXAM CHEST 1 VIEW: CPT

## 2022-09-12 PROCEDURE — 85610 PROTHROMBIN TIME: CPT

## 2022-09-12 PROCEDURE — 82009 KETONE BODYS QUAL: CPT

## 2022-09-12 PROCEDURE — 84520 ASSAY OF UREA NITROGEN: CPT

## 2022-09-12 PROCEDURE — 85730 THROMBOPLASTIN TIME PARTIAL: CPT

## 2022-09-12 PROCEDURE — 93005 ELECTROCARDIOGRAM TRACING: CPT

## 2022-09-12 PROCEDURE — 80053 COMPREHEN METABOLIC PANEL: CPT

## 2022-09-12 PROCEDURE — 82565 ASSAY OF CREATININE: CPT

## 2022-09-12 PROCEDURE — 84100 ASSAY OF PHOSPHORUS: CPT

## 2022-09-12 PROCEDURE — 82947 ASSAY GLUCOSE BLOOD QUANT: CPT

## 2022-09-12 PROCEDURE — 96360 HYDRATION IV INFUSION INIT: CPT

## 2022-09-12 RX ADMIN — PANTOPRAZOLE SODIUM SCH MG: 40 TABLET, DELAYED RELEASE ORAL at 19:57

## 2022-09-12 RX ADMIN — INSULIN HUMAN SCH MLS/HR: 100 INJECTION, SOLUTION PARENTERAL at 14:04

## 2022-09-12 RX ADMIN — DEXTROSE MONOHYDRATE, SODIUM CHLORIDE, AND POTASSIUM CHLORIDE SCH MLS/HR: 50; 4.5; 1.49 INJECTION, SOLUTION INTRAVENOUS at 14:01

## 2022-09-12 RX ADMIN — CEFAZOLIN SCH: 330 INJECTION, POWDER, FOR SOLUTION INTRAMUSCULAR; INTRAVENOUS at 07:52

## 2022-09-12 RX ADMIN — DEXTROSE MONOHYDRATE, SODIUM CHLORIDE, AND POTASSIUM CHLORIDE SCH MLS/HR: 50; 4.5; 1.49 INJECTION, SOLUTION INTRAVENOUS at 21:30

## 2022-09-12 RX ADMIN — DEXTROSE MONOHYDRATE, SODIUM CHLORIDE, AND POTASSIUM CHLORIDE SCH MLS/HR: 50; 4.5; 1.49 INJECTION, SOLUTION INTRAVENOUS at 05:44

## 2022-09-12 RX ADMIN — INSULIN HUMAN SCH MLS/HR: 100 INJECTION, SOLUTION PARENTERAL at 05:39

## 2022-09-12 RX ADMIN — INSULIN HUMAN SCH: 100 INJECTION, SOLUTION PARENTERAL at 21:28

## 2022-09-12 RX ADMIN — CEFAZOLIN SCH: 330 INJECTION, POWDER, FOR SOLUTION INTRAMUSCULAR; INTRAVENOUS at 19:53

## 2022-09-12 RX ADMIN — TAMSULOSIN HYDROCHLORIDE SCH MG: 0.4 CAPSULE ORAL at 19:57

## 2022-09-12 NOTE — XR
EXAMINATION TYPE: XR chest 1V portable

 

DATE OF EXAM: 9/12/2022

 

COMPARISON: NONE

 

HISTORY: Ketoacidosis

 

TECHNIQUE: Single view

 

FINDINGS: There is some elevation of the right diaphragm no heart failure. Heart size is normal. No p
leural effusion. There are chest leads.

 

IMPRESSION: Elevated right diaphragm. No pulmonary consolidation or heart failure.

## 2022-09-12 NOTE — ED
Recheck HPI





- General


Chief Complaint: Recheck/Abnormal Lab/Rx


Stated Complaint: high sugar


Time Seen by Provider: 09/12/22 02:48


Source: patient, RN notes reviewed, old records reviewed


Mode of arrival: ambulatory


Limitations: no limitations





- History of Present Illness


Initial Comments: 





This is a 57-year-old male to the ER for evaluation.  Patient Dese for 

evaluation of elevated blood sugar.  He feels lightheaded and dizzy.  Feels like

his heart is racing feels dehydrated taking all of blood sugars been out of 

control.  Patient does have history of diabetes has been treating his


MD Complaint: abnormal lab (Elevated blood sugar)


-: hour(s)


Returns Today for: Called Because of Abnormal Lab/Test


Context: planned re-check, called for abnormal lab result


Associated Symptoms: nausea, abdominal pain


Treatments Prior to Arrival: other medications





- Related Data


                                Home Medications











 Medication  Instructions  Recorded  Confirmed


 


Aspirin 325 mg PO DAILY 03/20/18 10/19/18


 


Canagliflozin [Invokana] 100 mg PO DAILY 03/20/18 10/19/18


 


Citalopram Hydrobromide [CeleXA] 20 mg PO DAILY 03/20/18 10/19/18


 


Cyanocobalamin (Vitamin B-12) 1,000 mcg PO DAILY 03/20/18 10/19/18





[Vitamin B-12]   


 


Multivitamin [Men's Multi-Vitamin] 1 each PO DAILY 03/20/18 10/19/18


 


Sildenafil Citrate [Viagra] 50 mg PO ONCE PRN 03/20/18 10/19/18


 


buPROPion HCL [buPROPion HCL SR] 150 mg PO DAILY 03/20/18 10/19/18


 


Testosterone Cypionate 100 mg IM Q14D 10/17/18 10/19/18





[Depo-Testosterone]   








                                  Previous Rx's











 Medication  Instructions  Recorded


 


Omeprazole [PriLOSEC] 20 mg PO AC-BRKFST #90 cap 10/19/18


 


HYDROcodone/APAP 5-325MG [Norco 1 tab PO Q4HR PRN 3 Days #18 tab 02/22/21





5-325]  


 


Ibuprofen 800 mg PO TID #20 tablet 02/22/21











                                    Allergies











Allergy/AdvReac Type Severity Reaction Status Date / Time


 


Penicillins Allergy Severe Anaphylaxis Verified 09/12/22 02:45














Review of Systems


ROS Statement: 


Those systems with pertinent positive or pertinent negative responses have been 

documented in the HPI.





ROS Other: All systems not noted in ROS Statement are negative.





Past Medical History


Past Medical History: Diabetes Mellitus, Sleep Apnea/CPAP/BIPAP


History of Any Multi-Drug Resistant Organisms: None Reported


Past Surgical History: Appendectomy


Additional Past Surgical History / Comment(s): COLONOSCOPY x3


Past Anesthesia/Blood Transfusion Reactions: No Reported Reaction


Additional Past Anesthesia/Blood Transfusion Reaction / Comment(s): No 

tranfusion to date


Past Psychological History: Depression


Smoking Status: Never smoker


Past Alcohol Use History: None Reported


Past Drug Use History: None Reported





- Past Family History


  ** Mother


Family Medical History: No Reported History





General Exam


Limitations: no limitations


General appearance: alert, in no apparent distress


Head exam: Present: atraumatic, normocephalic, normal inspection


Eye exam: Present: normal appearance, PERRL, EOMI.  Absent: scleral icterus, 

conjunctival injection, periorbital swelling


ENT exam: Present: normal exam, mucous membranes dry


Neck exam: Present: normal inspection.  Absent: tenderness, meningismus, 

lymphadenopathy


Respiratory exam: Present: normal lung sounds bilaterally.  Absent: respiratory 

distress, wheezes, rales, rhonchi, stridor


Cardiovascular Exam: Present: normal rhythm, tachycardia, normal heart sounds.  

Absent: systolic murmur, diastolic murmur, rubs, gallop, clicks


GI/Abdominal exam: Present: soft, normal bowel sounds.  Absent: distended, 

tenderness, guarding, rebound, rigid


Extremities exam: Present: normal inspection, full ROM, normal capillary refill.

 Absent: tenderness, pedal edema, joint swelling, calf tenderness


Back exam: Present: normal inspection


Neurological exam: Present: alert, oriented X3, CN II-XII intact


Psychiatric exam: Present: normal affect, normal mood


Skin exam: Present: warm, dry, intact, normal color.  Absent: rash





Course


                                   Vital Signs











  09/12/22 09/12/22 09/12/22





  02:42 03:02 04:45


 


Temperature 98.0 F  


 


Pulse Rate 125 H  120 H


 


Pulse Rate [  124 H 





Cardiac Monitor   





]   


 


Respiratory 18  





Rate   


 


Blood Pressure 139/78  


 


O2 Sat by Pulse 99  





Oximetry   














- Reevaluation(s)


Reevaluation #1: 





09/12/22 03:04


Medical record is reviewed


Reevaluation #2: 





09/12/22 05:07


Patient informed results and questions answered


Reevaluation #3: 





09/12/22 05:07


Patient showing mild improvement here in the ER





- Consultations


Consultation #1: 





Spoke with Dr. Cai who agrees to admit this patient





Medical Decision Making





- Medical Decision Making





57 male DF for evaluation patient Dese for evaluation of severe weakness getting

worse for 2 days to weeks.  Patient is found in our ER to have DKA diabetic 

ketoacidosis will be admitted for fluid resuscitation and correction of anion 

gap, electrolyte changes dietary counseling and diabetic counseling





- Lab Data


Result diagrams: 


                                 09/12/22 03:50





                                 09/12/22 03:50


                                   Lab Results











  09/12/22 09/12/22 09/12/22 Range/Units





  02:52 03:50 03:50 


 


WBC   13.5 H   (3.8-10.6)  k/uL


 


RBC   6.60 H   (4.30-5.90)  m/uL


 


Hgb   19.5 H*   (13.0-17.5)  gm/dL


 


Hct   61.0 H*   (39.0-53.0)  %


 


MCV   92.5   (80.0-100.0)  fL


 


MCH   29.5   (25.0-35.0)  pg


 


MCHC   31.9   (31.0-37.0)  g/dL


 


RDW   14.3   (11.5-15.5)  %


 


Plt Count   279   (150-450)  k/uL


 


MPV   7.3   


 


Neutrophils %   86   %


 


Lymphocytes %   7   %


 


Monocytes %   5   %


 


Eosinophils %   0   %


 


Basophils %   0   %


 


Neutrophils #   11.6 H   (1.3-7.7)  k/uL


 


Lymphocytes #   1.0   (1.0-4.8)  k/uL


 


Monocytes #   0.7   (0-1.0)  k/uL


 


Eosinophils #   0.1   (0-0.7)  k/uL


 


Basophils #   0.0   (0-0.2)  k/uL


 


Sodium    134 L  (137-145)  mmol/L


 


Potassium    5.5 H  (3.5-5.1)  mmol/L


 


Chloride    101  ()  mmol/L


 


Carbon Dioxide    7 L*  (22-30)  mmol/L


 


Anion Gap    26  mmol/L


 


BUN    19  (9-20)  mg/dL


 


Creatinine    1.32 H  (0.66-1.25)  mg/dL


 


Est GFR (CKD-EPI)AfAm    69  (>60 ml/min/1.73 sqM)  


 


Est GFR (CKD-EPI)NonAf    59  (>60 ml/min/1.73 sqM)  


 


Glucose    298 H  (74-99)  mg/dL


 


POC Glucose (mg/dL)  295 H    ()  mg/dL


 


POC Glu Operater ID  Pili Singh    


 


Plasma Lactic Acid Mike     (0.7-2.0)  mmol/L


 


Calcium    10.6 H  (8.4-10.2)  mg/dL


 


Phosphorus    4.9 H  (2.5-4.5)  mg/dL


 


Magnesium    2.4 H  (1.6-2.3)  mg/dL


 


Total Bilirubin    0.7  (0.2-1.3)  mg/dL


 


AST    27  (17-59)  U/L


 


ALT    31  (4-49)  U/L


 


Alkaline Phosphatase    79  ()  U/L


 


Troponin I     (0.000-0.034)  ng/mL


 


NT-Pro-B Natriuret Pep     pg/mL


 


Total Protein    8.6 H  (6.3-8.2)  g/dL


 


Albumin    5.6 H  (3.5-5.0)  g/dL


 


Acetone, Qual    Positive  (Negative)  














  09/12/22 09/12/22 09/12/22 Range/Units





  03:50 03:50 03:50 


 


WBC     (3.8-10.6)  k/uL


 


RBC     (4.30-5.90)  m/uL


 


Hgb     (13.0-17.5)  gm/dL


 


Hct     (39.0-53.0)  %


 


MCV     (80.0-100.0)  fL


 


MCH     (25.0-35.0)  pg


 


MCHC     (31.0-37.0)  g/dL


 


RDW     (11.5-15.5)  %


 


Plt Count     (150-450)  k/uL


 


MPV     


 


Neutrophils %     %


 


Lymphocytes %     %


 


Monocytes %     %


 


Eosinophils %     %


 


Basophils %     %


 


Neutrophils #     (1.3-7.7)  k/uL


 


Lymphocytes #     (1.0-4.8)  k/uL


 


Monocytes #     (0-1.0)  k/uL


 


Eosinophils #     (0-0.7)  k/uL


 


Basophils #     (0-0.2)  k/uL


 


Sodium     (137-145)  mmol/L


 


Potassium     (3.5-5.1)  mmol/L


 


Chloride     ()  mmol/L


 


Carbon Dioxide     (22-30)  mmol/L


 


Anion Gap     mmol/L


 


BUN     (9-20)  mg/dL


 


Creatinine     (0.66-1.25)  mg/dL


 


Est GFR (CKD-EPI)AfAm     (>60 ml/min/1.73 sqM)  


 


Est GFR (CKD-EPI)NonAf     (>60 ml/min/1.73 sqM)  


 


Glucose     (74-99)  mg/dL


 


POC Glucose (mg/dL)     ()  mg/dL


 


POC Glu Operater ID     


 


Plasma Lactic Acid Mike  1.4    (0.7-2.0)  mmol/L


 


Calcium     (8.4-10.2)  mg/dL


 


Phosphorus     (2.5-4.5)  mg/dL


 


Magnesium     (1.6-2.3)  mg/dL


 


Total Bilirubin     (0.2-1.3)  mg/dL


 


AST     (17-59)  U/L


 


ALT     (4-49)  U/L


 


Alkaline Phosphatase     ()  U/L


 


Troponin I   <0.012   (0.000-0.034)  ng/mL


 


NT-Pro-B Natriuret Pep    30  pg/mL


 


Total Protein     (6.3-8.2)  g/dL


 


Albumin     (3.5-5.0)  g/dL


 


Acetone, Qual     (Negative)  














- EKG Data


-: EKG Interpreted by Me (EKG shows sinus tachycardia 116 CO  160 QRS 12 5 QTc 

405)





- Radiology Data


Radiology results: report reviewed (Chest x-rays negative for acute disease), 

image reviewed





Critical Care Time


Critical Care Time: Yes


Total Critical Care Time: 31





Disposition


Clinical Impression: 


 DKA (diabetic ketoacidosis), Tachycardia, Weakness





Disposition: ADMITTED AS IP TO THIS [facility]


Condition: Serious


Is patient prescribed a controlled substance at d/c from ED?: No


Referrals: 


Austin Roque MD [REFERRING] - 1-2 days

## 2022-09-13 VITALS — RESPIRATION RATE: 16 BRPM

## 2022-09-13 LAB
ALBUMIN SERPL-MCNC: 4.3 G/DL (ref 3.5–5)
ALBUMIN SERPL-MCNC: 4.5 G/DL (ref 3.5–5)
ALBUMIN SERPL-MCNC: 4.6 G/DL (ref 3.5–5)
ALBUMIN SERPL-MCNC: 4.7 G/DL (ref 3.5–5)
ALP SERPL-CCNC: 60 U/L (ref 38–126)
ALP SERPL-CCNC: 63 U/L (ref 38–126)
ALP SERPL-CCNC: 63 U/L (ref 38–126)
ALP SERPL-CCNC: 64 U/L (ref 38–126)
ALT SERPL-CCNC: 24 U/L (ref 4–49)
ALT SERPL-CCNC: 25 U/L (ref 4–49)
ANION GAP SERPL CALC-SCNC: 12 MMOL/L
ANION GAP SERPL CALC-SCNC: 14 MMOL/L
ANION GAP SERPL CALC-SCNC: 17 MMOL/L
ANION GAP SERPL CALC-SCNC: 18 MMOL/L
AST SERPL-CCNC: 21 U/L (ref 17–59)
AST SERPL-CCNC: 21 U/L (ref 17–59)
AST SERPL-CCNC: 22 U/L (ref 17–59)
AST SERPL-CCNC: 28 U/L (ref 17–59)
BASOPHILS # BLD AUTO: 0 K/UL (ref 0–0.2)
BASOPHILS NFR BLD AUTO: 0 %
BUN SERPL-SCNC: 10 MG/DL (ref 9–20)
BUN SERPL-SCNC: 13 MG/DL (ref 9–20)
CALCIUM SPEC-MCNC: 8.2 MG/DL (ref 8.4–10.2)
CALCIUM SPEC-MCNC: 8.4 MG/DL (ref 8.4–10.2)
CALCIUM SPEC-MCNC: 8.6 MG/DL (ref 8.4–10.2)
CALCIUM SPEC-MCNC: 8.8 MG/DL (ref 8.4–10.2)
CHLORIDE SERPL-SCNC: 103 MMOL/L (ref 98–107)
CHLORIDE SERPL-SCNC: 105 MMOL/L (ref 98–107)
CHLORIDE SERPL-SCNC: 105 MMOL/L (ref 98–107)
CHLORIDE SERPL-SCNC: 106 MMOL/L (ref 98–107)
CHOLEST SERPL-MCNC: 244 MG/DL (ref 0–200)
CO2 SERPL-SCNC: 14 MMOL/L (ref 22–30)
CO2 SERPL-SCNC: 14 MMOL/L (ref 22–30)
CO2 SERPL-SCNC: 17 MMOL/L (ref 22–30)
CO2 SERPL-SCNC: 19 MMOL/L (ref 22–30)
EOSINOPHIL # BLD AUTO: 0 K/UL (ref 0–0.7)
EOSINOPHIL NFR BLD AUTO: 0 %
ERYTHROCYTE [DISTWIDTH] IN BLOOD BY AUTOMATED COUNT: 5.6 M/UL (ref 4.3–5.9)
ERYTHROCYTE [DISTWIDTH] IN BLOOD: 14.8 % (ref 11.5–15.5)
GLUCOSE BLD-MCNC: 141 MG/DL (ref 70–110)
GLUCOSE BLD-MCNC: 167 MG/DL (ref 70–110)
GLUCOSE BLD-MCNC: 171 MG/DL (ref 70–110)
GLUCOSE BLD-MCNC: 173 MG/DL (ref 70–110)
GLUCOSE BLD-MCNC: 181 MG/DL (ref 70–110)
GLUCOSE BLD-MCNC: 184 MG/DL (ref 70–110)
GLUCOSE BLD-MCNC: 186 MG/DL (ref 70–110)
GLUCOSE BLD-MCNC: 189 MG/DL (ref 70–110)
GLUCOSE BLD-MCNC: 192 MG/DL (ref 70–110)
GLUCOSE BLD-MCNC: 196 MG/DL (ref 70–110)
GLUCOSE BLD-MCNC: 205 MG/DL (ref 70–110)
GLUCOSE BLD-MCNC: 220 MG/DL (ref 70–110)
GLUCOSE BLD-MCNC: 241 MG/DL (ref 70–110)
GLUCOSE BLD-MCNC: 294 MG/DL (ref 70–110)
GLUCOSE BLD-MCNC: 304 MG/DL (ref 70–110)
GLUCOSE SERPL-MCNC: 148 MG/DL (ref 74–99)
GLUCOSE SERPL-MCNC: 176 MG/DL (ref 74–99)
GLUCOSE SERPL-MCNC: 193 MG/DL (ref 74–99)
GLUCOSE SERPL-MCNC: 207 MG/DL (ref 74–99)
HCT VFR BLD AUTO: 51.1 % (ref 39–53)
HDLC SERPL-MCNC: 45.7 MG/DL (ref 40–60)
HGB BLD-MCNC: 16.7 GM/DL (ref 13–17.5)
LDLC SERPL CALC-MCNC: 173.1 MG/DL (ref 0–131)
LYMPHOCYTES # SPEC AUTO: 1.3 K/UL (ref 1–4.8)
LYMPHOCYTES NFR SPEC AUTO: 18 %
MCH RBC QN AUTO: 29.8 PG (ref 25–35)
MCHC RBC AUTO-ENTMCNC: 32.7 G/DL (ref 31–37)
MCV RBC AUTO: 91.2 FL (ref 80–100)
MONOCYTES # BLD AUTO: 0.5 K/UL (ref 0–1)
MONOCYTES NFR BLD AUTO: 7 %
NEUTROPHILS # BLD AUTO: 5.5 K/UL (ref 1.3–7.7)
NEUTROPHILS NFR BLD AUTO: 73 %
PLATELET # BLD AUTO: 199 K/UL (ref 150–450)
POTASSIUM SERPL-SCNC: 3.8 MMOL/L (ref 3.5–5.1)
POTASSIUM SERPL-SCNC: 4 MMOL/L (ref 3.5–5.1)
POTASSIUM SERPL-SCNC: 4.3 MMOL/L (ref 3.5–5.1)
POTASSIUM SERPL-SCNC: 4.4 MMOL/L (ref 3.5–5.1)
PROT SERPL-MCNC: 6.6 G/DL (ref 6.3–8.2)
PROT SERPL-MCNC: 6.8 G/DL (ref 6.3–8.2)
PROT SERPL-MCNC: 7 G/DL (ref 6.3–8.2)
PROT SERPL-MCNC: 7 G/DL (ref 6.3–8.2)
SODIUM SERPL-SCNC: 135 MMOL/L (ref 137–145)
SODIUM SERPL-SCNC: 136 MMOL/L (ref 137–145)
SODIUM SERPL-SCNC: 136 MMOL/L (ref 137–145)
SODIUM SERPL-SCNC: 137 MMOL/L (ref 137–145)
TRIGL SERPL-MCNC: 126 MG/DL (ref 0–149)
VLDLC SERPL CALC-MCNC: 25.2 MG/DL (ref 5–40)
WBC # BLD AUTO: 7.6 K/UL (ref 3.8–10.6)

## 2022-09-13 RX ADMIN — PANTOPRAZOLE SODIUM SCH MG: 40 TABLET, DELAYED RELEASE ORAL at 21:02

## 2022-09-13 RX ADMIN — THERA TABS SCH EACH: TAB at 08:07

## 2022-09-13 RX ADMIN — DEXTROSE MONOHYDRATE, SODIUM CHLORIDE, AND POTASSIUM CHLORIDE SCH MLS/HR: 50; 4.5; 1.49 INJECTION, SOLUTION INTRAVENOUS at 17:23

## 2022-09-13 RX ADMIN — TAMSULOSIN HYDROCHLORIDE SCH MG: 0.4 CAPSULE ORAL at 08:03

## 2022-09-13 RX ADMIN — TAMSULOSIN HYDROCHLORIDE SCH MG: 0.4 CAPSULE ORAL at 21:02

## 2022-09-13 RX ADMIN — INSULIN HUMAN SCH: 100 INJECTION, SOLUTION PARENTERAL at 04:30

## 2022-09-13 RX ADMIN — INSULIN HUMAN SCH: 100 INJECTION, SOLUTION PARENTERAL at 18:55

## 2022-09-13 RX ADMIN — LEUCINE, PHENYLALANINE, LYSINE, METHIONINE, ISOLEUCINE, VALINE, HISTIDINE, THREONINE, TRYPTOPHAN, ALANINE, GLYCINE, ARGININE, PROLINE, SERINE, TYROSINE, DEXTROSE SCH MLS/HR: 365; 280; 290; 200; 300; 290; 240; 210; 90; 1035; 515; 575; 340; 250; 20; 15 INJECTION INTRAVENOUS at 17:23

## 2022-09-13 RX ADMIN — LEUCINE, PHENYLALANINE, LYSINE, METHIONINE, ISOLEUCINE, VALINE, HISTIDINE, THREONINE, TRYPTOPHAN, ALANINE, GLYCINE, ARGININE, PROLINE, SERINE, TYROSINE, DEXTROSE SCH MLS/HR: 365; 280; 290; 200; 300; 290; 240; 210; 90; 1035; 515; 575; 340; 250; 20; 15 INJECTION INTRAVENOUS at 20:04

## 2022-09-13 RX ADMIN — INSULIN HUMAN SCH: 100 INJECTION, SOLUTION PARENTERAL at 19:54

## 2022-09-13 RX ADMIN — Medication SCH MCG: at 08:03

## 2022-09-13 RX ADMIN — BUPROPION HYDROCHLORIDE SCH MG: 300 TABLET, FILM COATED, EXTENDED RELEASE ORAL at 08:03

## 2022-09-13 RX ADMIN — DEXTROSE MONOHYDRATE, SODIUM CHLORIDE, AND POTASSIUM CHLORIDE SCH MLS/HR: 50; 4.5; 1.49 INJECTION, SOLUTION INTRAVENOUS at 06:55

## 2022-09-13 RX ADMIN — CITALOPRAM HYDROBROMIDE SCH MG: 20 TABLET ORAL at 08:03

## 2022-09-13 RX ADMIN — DEXTROSE MONOHYDRATE, SODIUM CHLORIDE, AND POTASSIUM CHLORIDE SCH: 50; 4.5; 1.49 INJECTION, SOLUTION INTRAVENOUS at 01:49

## 2022-09-13 NOTE — HP
HISTORY AND PHYSICAL



HISTORY OF PRESENT ILLNESS:

A 57-year-old white male with a blood sugar, lightheaded, dizzy, heart racing, severe

dehydration, _____ diabetic ketoacidosis, _____ severe diarrhea over the last 2 days.

He stopped drinking water.  He is admitted with DKA and is on DKA protocol _____

insulin on discharge.



MEDICATIONS:

1. Invokana 100 mg daily.

2. Celexa 20 mg daily.

3. Vitamin B12 of 1000 units daily.

4. Multivitamin daily.

5. Sildenafil 50 mg daily.



ALLERGIES:

Penicillin.



REVIEW OF SYSTEMS:

A 14-point review of systems is otherwise negative.



PAST MEDICAL HISTORY:

Diabetes mellitus, sleep apnea, depression.



FAMILY HISTORY:

Mother _____.



PHYSICAL EXAMINATION:

VITAL SIGNS:  Stable, afebrile.

CARDIOVASCULAR:  S1, S2.

HEMATOLOGY: Negative Homans.

PSYCH:  Fair mood and affect.

ENDOCRINE:  BMI is over 40.

VITAL SIGNS:  Pulse is 120 to 125, temperature 98, blood pressure 139/70, O2 of 99% on

room air, respiratory rate 16 to 18.

HEMATOLOGY:  Negative Homans.

PSYCH:  Fair mood and affect.

CARDIOVASCULAR:  S1, S2.

LUNGS:  _____ sounds.

INTEGUMENT:  Dry skin turgor. Dry mucous membranes.



LABORATORY DATA:

Hemoglobin is 9.5, white count is 13.5, platelets 279.



ASSESSMENT:

Diabetic ketoacidosis, dehydration, tachycardia, generalized weakness.  Prognosis

guarded. Current treatment. Continue with DKA protocol.





MMKANIKAL / JAKEN: [id number] / Job#: [id number]

## 2022-09-14 VITALS — DIASTOLIC BLOOD PRESSURE: 77 MMHG | SYSTOLIC BLOOD PRESSURE: 123 MMHG | HEART RATE: 106 BPM

## 2022-09-14 VITALS — TEMPERATURE: 98.4 F

## 2022-09-14 LAB
ALBUMIN SERPL-MCNC: 4.3 G/DL (ref 3.5–5)
ALP SERPL-CCNC: 64 U/L (ref 38–126)
ALT SERPL-CCNC: 30 U/L (ref 4–49)
ANION GAP SERPL CALC-SCNC: 13 MMOL/L
AST SERPL-CCNC: 29 U/L (ref 17–59)
BASOPHILS # BLD AUTO: 0 K/UL (ref 0–0.2)
BASOPHILS NFR BLD AUTO: 0 %
BUN SERPL-SCNC: 11 MG/DL (ref 9–20)
CALCIUM SPEC-MCNC: 8.6 MG/DL (ref 8.4–10.2)
CHLORIDE SERPL-SCNC: 103 MMOL/L (ref 98–107)
CO2 SERPL-SCNC: 22 MMOL/L (ref 22–30)
EOSINOPHIL # BLD AUTO: 0 K/UL (ref 0–0.7)
EOSINOPHIL NFR BLD AUTO: 1 %
ERYTHROCYTE [DISTWIDTH] IN BLOOD BY AUTOMATED COUNT: 5.55 M/UL (ref 4.3–5.9)
ERYTHROCYTE [DISTWIDTH] IN BLOOD: 14.2 % (ref 11.5–15.5)
GLUCOSE BLD-MCNC: 153 MG/DL (ref 70–110)
GLUCOSE BLD-MCNC: 170 MG/DL (ref 70–110)
GLUCOSE BLD-MCNC: 173 MG/DL (ref 70–110)
GLUCOSE BLD-MCNC: 184 MG/DL (ref 70–110)
GLUCOSE SERPL-MCNC: 193 MG/DL (ref 74–99)
HCT VFR BLD AUTO: 49.8 % (ref 39–53)
HGB BLD-MCNC: 16.3 GM/DL (ref 13–17.5)
LYMPHOCYTES # SPEC AUTO: 1 K/UL (ref 1–4.8)
LYMPHOCYTES NFR SPEC AUTO: 19 %
MCH RBC QN AUTO: 29.3 PG (ref 25–35)
MCHC RBC AUTO-ENTMCNC: 32.7 G/DL (ref 31–37)
MCV RBC AUTO: 89.7 FL (ref 80–100)
MONOCYTES # BLD AUTO: 0.4 K/UL (ref 0–1)
MONOCYTES NFR BLD AUTO: 8 %
NEUTROPHILS # BLD AUTO: 3.7 K/UL (ref 1.3–7.7)
NEUTROPHILS NFR BLD AUTO: 70 %
PLATELET # BLD AUTO: 151 K/UL (ref 150–450)
POTASSIUM SERPL-SCNC: 3.6 MMOL/L (ref 3.5–5.1)
PROT SERPL-MCNC: 6.5 G/DL (ref 6.3–8.2)
SODIUM SERPL-SCNC: 138 MMOL/L (ref 137–145)
WBC # BLD AUTO: 5.3 K/UL (ref 3.8–10.6)

## 2022-09-14 RX ADMIN — CITALOPRAM HYDROBROMIDE SCH MG: 20 TABLET ORAL at 08:09

## 2022-09-14 RX ADMIN — Medication SCH MCG: at 08:09

## 2022-09-14 RX ADMIN — THERA TABS SCH EACH: TAB at 08:10

## 2022-09-14 RX ADMIN — TAMSULOSIN HYDROCHLORIDE SCH MG: 0.4 CAPSULE ORAL at 08:10

## 2022-09-14 RX ADMIN — BUPROPION HYDROCHLORIDE SCH MG: 300 TABLET, FILM COATED, EXTENDED RELEASE ORAL at 08:10

## 2022-09-15 NOTE — PN
PROGRESS NOTE



SUBJECTIVE:

The patient is slowly improving.  His anion gap is closed.  Sugars have been below 200

all day.  Nurse switched him over to Lantus 20 units a day and then put him on Accu-

Chek protocol.  Give him regular food.  Keep him on normal saline IV.  Take him off

insulin drip.  Home medicines have been restarted



OBJECTIVE:

CARDIOVASCULAR:  S1 and S2.

LUNGS:  Clear.

GI:  Soft.

INTEGUMENTARY:  Fairly decent skin turgor.



ASSESSMENT:

1. Diabetic ketoacidosis.

2. Severely dehydrated.

3. Anion gap metabolic acidosis.

4. Sleep apnea.

5. Obesity.

6. Hypertension.



PLAN:

Continue current treatments.  _____ DKA protocol.  Give him fluids and put him on long-

acting insulin with Accu-Chek protocol.



PROGNOSIS:

Guarded.





MMODL / IJN: [id number] / Job#: [id number]

## 2022-10-27 ENCOUNTER — HOSPITAL ENCOUNTER (OUTPATIENT)
Dept: HOSPITAL 47 - RADNMMAIN | Age: 57
Discharge: HOME | End: 2022-10-27
Attending: FAMILY MEDICINE
Payer: MEDICAID

## 2022-10-27 ENCOUNTER — HOSPITAL ENCOUNTER (OUTPATIENT)
Dept: HOSPITAL 47 - RADECHMAIN | Age: 57
Discharge: HOME | End: 2022-10-27
Attending: FAMILY MEDICINE
Payer: MEDICAID

## 2022-10-27 DIAGNOSIS — R94.31: Primary | ICD-10-CM

## 2022-10-27 PROCEDURE — 93017 CV STRESS TEST TRACING ONLY: CPT

## 2022-10-27 PROCEDURE — 93306 TTE W/DOPPLER COMPLETE: CPT

## 2022-10-27 PROCEDURE — 78452 HT MUSCLE IMAGE SPECT MULT: CPT

## 2022-10-27 NOTE — CA
Exercise Stress Test Report 

 

 Name:    eKyshawn Macedo 

 

 MRN:    [id number] 

 

 Exam Date: 10/27/2022 09:54 

 

 Exam Location:      [facility]  

                     Stress 

 

 Ht (in):     70     Wt (lb):     295    BSA:    2.46 

 

 Ordering Phys:       Abimael Roque MD 

 

 Referring Phys:      TJ, 

 

 Technologist:        Hector Rees 

 

 Age:    57    Gender:    M 

 

 :    1965 

 Procedure CPT: 

 

 Indications:              R94.31 ABNORMAL EKG 

 

 ICD-10 Codes: 

 

 Patient History:          Abnormal EKG and diabetes 

 

 Medications: 

 

 Meds past 24 hrs: 

 

 Pretest Chest Pain: 

 

 STRESS TEST      Jameson 

 

 Protocol 

 

 

 

 

 Exercise Duration (min:sec):         06:30 

 Max ST Depressions (mm): 

 Angina Score: 

 Sevilla Score: 

 Resting HR (bpm):      67 

 

 Peak HR (bpm):         159 

 

 Resting BP (mmHg):       109    /   80 

 

 Peak BP (mmHg):       197   /   71 

 

 MPHR:    162     Target HR:      138 

 

 % MPHR:     98 

 METS:  7.7 

 

 Total Dose: 

 Peak Dose: 

 Atropine: 

 Double Product:       [id number] 

 

 BP Response: 

 

 Stress Termination:       TARGET HR REACHED/MAX EXERTION 

 

 Stress Symptoms: 

 DIFFICULTY IN BREATHING 

 

 Stress Summary: 

 

 

  ECG ANALYSIS 

 

 Resting ECG: 

 

 Stress ECG: 

 

 CONCLUSIONS 

 Baseline heart rate 107 beats a minute, Baseline blood pressure  

 109/80 mmHg 

 Baseline twelve-lead EKG showed sinus rhythm normal WA narrow  

 test normal ST segments sinus tachycardia 

 Patient accessory Jameson protocol for 6-1/2 minutes, achieving a  

 peak heart rate 151 beats a minute 

 Normal blood pressure response 

 No ECG evidence for ischemia 

 Occasional PVCs 

 Average exercise capacity 

 

 Dr. Antonio Edmondson MD 

 (Electronically Signed) 

 Final Date:      2022 21:02

## 2022-10-27 NOTE — NM
EXAMINATION TYPE: NM stress cardiolite complete

 

DATE OF EXAM: 10/27/2022

 

COMPARISON: Prior exam 8/28/2015

 

HISTORY: Abnormal EKG

 

TECHNIQUE:  After the intravenous administration of 9.4 mCi Tc 99m Sestamibi - Rest images obtained 6
5 minutes post injection.  The patient exercised using a  KAT protocol and 1 minute prior to peak e
xercise was injected with 25.6 mCi Tc 99m Sestamibi - Stress images obtained 25 minutes post injectio
n.

 

FINDINGS: 

 

Targeted heart rate was achieved during performance of the study, patient achieved 98% of predicted m
aximal heart rate. Review of stress and rest SPECT images demonstrates decreased uptake more so on re
st images than on stress images. Decreased uptake is suspected along the lateral wall towards the inf
erior wall more so on stress than rest images. Gated analysis shows normal wall motion with an estima
jose left ventricular ejection fraction of 52%.

 

 

 

IMPRESSION:  

 

Findings consistent with stress-induced left ventricular myocardial ischemia along the inferolateral 
left ventricle

 

A Yellow level critical message alert has been initiated for Abimael Roque MD via the [employer]
0 | Critical Results System on 10/27/2022 1:40 PM.  This message alert has been sent to Abimael Roque MD via the preferences provided by the clinician for the receipt of Radiology Critical Findings. Me
ssage ID [id number].

## 2022-10-27 NOTE — CA
Transthoracic Echo Report 

 Name: Keyshawn Macedo 

 MRN:    [id number] 

 Age:    57     Gender:     M 

 

 :    1965 

 Exam Date:     10/27/2022 09:02 

 Exam Location: [facility] 

 Ht (in):     70     Wt (lb):     295 

 Ordering Physician:        Abimael Roque MD 

 Attending/Referring Phys:         Abimael Roque MD 

 Technician         Dary Tidwell, [facility] 

 Procedure CPT: 

 Indications:       R94.31 ABNORMAL EKG 

 

 Cardiac Hx: 

 Technical Quality:      Technically difficult study 

 Contrast 1:    Lumason                     Total Dose (mL):      4 

 Contrast 2:                                Total Dose (mL): 

 

 MEASUREMENTS  (Male / Female) Normal Values 

 2D ECHO 

 LV Diastolic Diameter PLAX        4.3 cm                4.2 - 5.9 / 3.9 - 5.3 cm 

 LV Systolic Diameter PLAX         2.6 cm                 

 IVS Diastolic Thickness           1.5 cm                0.6 - 1.0 / 0.6 - 0.9 cm 

 LVPW Diastolic Thickness          1.7 cm                0.6 - 1.0 / 0.6 - 0.9 cm 

 LV Relative Wall Thickness        0.7                    

 LA Volume                         44.6 cm???              18 - 58 / 22 - 52 cm??? 

 

 M-MODE 

 Aortic Root Diameter MM           3.0 cm                 

 LA Systolic Diameter MM           4.0 cm                 

 LA Ao Ratio MM                    1.4                    

 AV Cusp Separation MM             1.7 cm                 

 

 DOPPLER 

 AV Peak Velocity                  169.3 cm/s             

 AV Peak Gradient                  11.5 mmHg              

 LVOT Peak Velocity                87.6 cm/s              

 LVOT Peak Gradient                3.1 mmHg               

 MV Area PHT                       3.3 cm???                

 Mitral E Point Velocity           54.7 cm/s              

 Mitral A Point Velocity           78.1 cm/s              

 Mitral E to A Ratio               0.7                    

 MV Deceleration Time              229.3 ms               

 TR Peak Velocity                  219.6 cm/s             

 TR Peak Gradient                  19.3 mmHg              

 Right Ventricular Systolic Press  24.3 mmHg              

 

 

 FINDINGS 

 Left Ventricle 

 Moderately increased left ventricular wall thickness. Normal left ventricular  

 systolic function with no obvious regional wall motion abnormalities. Left  

 ventricular ejection fraction is estimated at 55 %. 

 

 Right Ventricle 

 Normal right ventricular size and function. Right ventricular systolic pressure  

 within normal limits. 

 

 Right Atrium 

 Normal right atrial size. 

 

 Left Atrium 

 Normal left atrial size. No evidence for an atrial septal defect. 

 

 Mitral Valve 

 Structurally normal mitral valve. No mitral stenosis. Mild mitral  

 regurgitation. 

 

 Aortic Valve 

 No aortic valve stenosis or regurgitation. 

 

 Tricuspid Valve 

 Mild tricuspid regurgitation. 

 

 Pulmonic Valve 

 Trace pulmonic regurgitation. 

 

 Pericardium 

 No pericardial effusion. 

 

 Aorta 

 Normal size aortic root and proximal ascending aorta. 

 

 CONCLUSIONS 

 LVH with preserved systolic function 

 Previewed by:  

 Dr. Antonio Edmondson MD 

 (Electronically Signed) 

 Final Date:      2022 21:00 M//PUI [id number]